# Patient Record
Sex: MALE | Race: WHITE | NOT HISPANIC OR LATINO | Employment: FULL TIME | URBAN - METROPOLITAN AREA
[De-identification: names, ages, dates, MRNs, and addresses within clinical notes are randomized per-mention and may not be internally consistent; named-entity substitution may affect disease eponyms.]

---

## 2023-06-28 RX ORDER — CETIRIZINE HYDROCHLORIDE 10 MG/1
1 TABLET ORAL DAILY
COMMUNITY
Start: 2020-05-12

## 2023-06-28 RX ORDER — ALBUTEROL SULFATE 90 UG/1
2 AEROSOL, METERED RESPIRATORY (INHALATION)
COMMUNITY
Start: 2020-05-12 | End: 2023-07-07 | Stop reason: SDUPTHER

## 2023-06-28 RX ORDER — BUSPIRONE HYDROCHLORIDE 10 MG/1
10 TABLET ORAL EVERY 12 HOURS
COMMUNITY
Start: 2021-11-02 | End: 2023-07-07 | Stop reason: DRUGHIGH

## 2023-06-28 RX ORDER — HYDROXYZINE HYDROCHLORIDE 25 MG/1
25 TABLET, FILM COATED ORAL EVERY 6 HOURS PRN
COMMUNITY
Start: 2022-07-01 | End: 2023-07-17 | Stop reason: ALTCHOICE

## 2023-06-28 RX ORDER — MONTELUKAST SODIUM 10 MG/1
1 TABLET ORAL DAILY
COMMUNITY
Start: 2019-04-16 | End: 2023-07-07 | Stop reason: SDUPTHER

## 2023-07-05 PROBLEM — H43.393 VITREOUS FLOATERS OF BOTH EYES: Status: ACTIVE | Noted: 2023-07-05

## 2023-07-05 PROBLEM — J45.909 ASTHMA (HHS-HCC): Status: ACTIVE | Noted: 2023-07-05

## 2023-07-05 PROBLEM — R13.10 DYSPHAGIA: Status: ACTIVE | Noted: 2023-07-05

## 2023-07-05 PROBLEM — M54.2 NECK PAIN: Status: RESOLVED | Noted: 2023-07-05 | Resolved: 2023-07-05

## 2023-07-05 PROBLEM — R59.0 CERVICAL LYMPHADENOPATHY: Status: ACTIVE | Noted: 2023-07-05

## 2023-07-05 PROBLEM — G25.81 RESTLESS LEGS: Status: ACTIVE | Noted: 2023-07-05

## 2023-07-05 PROBLEM — H33.323 PERIPHERAL RETINAL HOLE OF BOTH EYES: Status: ACTIVE | Noted: 2023-07-05

## 2023-07-05 PROBLEM — H35.413 LATTICE DEGENERATION OF BOTH RETINAS: Status: ACTIVE | Noted: 2023-07-05

## 2023-07-05 PROBLEM — R29.898 OTHER SYMPTOMS AND SIGNS INVOLVING THE MUSCULOSKELETAL SYSTEM: Status: RESOLVED | Noted: 2023-07-05 | Resolved: 2023-07-05

## 2023-07-05 PROBLEM — H52.13 BILATERAL MYOPIA: Status: ACTIVE | Noted: 2023-07-05

## 2023-07-05 PROBLEM — F43.22 ACUTE ADJUSTMENT DISORDER WITH ANXIETY: Status: ACTIVE | Noted: 2023-07-05

## 2023-07-05 PROBLEM — J02.9 SORE THROAT: Status: RESOLVED | Noted: 2023-07-05 | Resolved: 2023-07-05

## 2023-07-05 PROBLEM — K21.9 CHRONIC GERD: Status: ACTIVE | Noted: 2023-07-05

## 2023-07-05 PROBLEM — H52.203 ASTIGMATISM, BILATERAL: Status: ACTIVE | Noted: 2023-07-05

## 2023-07-05 PROBLEM — K21.00 GASTRO-ESOPHAGEAL REFLUX DISEASE WITH ESOPHAGITIS: Status: ACTIVE | Noted: 2023-07-05

## 2023-07-05 PROBLEM — H52.4 BILATERAL PRESBYOPIA: Status: ACTIVE | Noted: 2023-07-05

## 2023-07-07 ENCOUNTER — OFFICE VISIT (OUTPATIENT)
Dept: PRIMARY CARE | Facility: CLINIC | Age: 53
End: 2023-07-07
Payer: COMMERCIAL

## 2023-07-07 VITALS — BODY MASS INDEX: 22.82 KG/M2 | DIASTOLIC BLOOD PRESSURE: 72 MMHG | WEIGHT: 173 LBS | SYSTOLIC BLOOD PRESSURE: 124 MMHG

## 2023-07-07 DIAGNOSIS — G25.81 RESTLESS LEGS: ICD-10-CM

## 2023-07-07 DIAGNOSIS — F41.9 ANXIETY: ICD-10-CM

## 2023-07-07 DIAGNOSIS — J30.2 SEASONAL ALLERGIC RHINITIS, UNSPECIFIED TRIGGER: ICD-10-CM

## 2023-07-07 DIAGNOSIS — J45.20 MILD INTERMITTENT ASTHMA, UNSPECIFIED WHETHER COMPLICATED (HHS-HCC): Primary | ICD-10-CM

## 2023-07-07 PROBLEM — F43.22 ACUTE ADJUSTMENT DISORDER WITH ANXIETY: Status: RESOLVED | Noted: 2023-07-05 | Resolved: 2023-07-07

## 2023-07-07 PROCEDURE — 99213 OFFICE O/P EST LOW 20 MIN: CPT | Performed by: FAMILY MEDICINE

## 2023-07-07 PROCEDURE — 1036F TOBACCO NON-USER: CPT | Performed by: FAMILY MEDICINE

## 2023-07-07 RX ORDER — MONTELUKAST SODIUM 10 MG/1
10 TABLET ORAL DAILY
Qty: 90 TABLET | Refills: 3 | Status: SHIPPED | OUTPATIENT
Start: 2023-07-07

## 2023-07-07 RX ORDER — BUSPIRONE HYDROCHLORIDE 15 MG/1
15 TABLET ORAL 2 TIMES DAILY
Qty: 180 TABLET | Refills: 3 | Status: SHIPPED | OUTPATIENT
Start: 2023-07-07 | End: 2023-09-12 | Stop reason: SDUPTHER

## 2023-07-07 RX ORDER — ALBUTEROL SULFATE 90 UG/1
2 AEROSOL, METERED RESPIRATORY (INHALATION)
Qty: 18 G | Refills: 1 | Status: SHIPPED | OUTPATIENT
Start: 2023-07-07 | End: 2023-09-14 | Stop reason: SDUPTHER

## 2023-07-07 NOTE — ASSESSMENT & PLAN NOTE
- not well controlled  - increase Buspirone from 10 mg PO BID to 15 mg PO BID  - Hydroxyzine makes him grouchy in the am and also he doesn't feel great for a few hours  - recommend counseling/therapy, he declines  - sleep is impacted

## 2023-07-07 NOTE — PROGRESS NOTES
Chief complaint:   Chief Complaint   Patient presents with    Follow-up     Yearly follow up       HPI:  Marvin Mahan is a 52 y.o. male who presents for evaluation of anxiety and his asthma. He has not needed to use his albuterol in 12 years. He takes Montlukast daily. His allergic sx are well controlled. His anxiety is not well controlled. He worries about the state of the country and the world, his wife and her medical conditions and his work. He is not sleeping well due to the anxiety. He goes to bed and is asleep by 9 pm with goal to wake at 6 am. He has restless legs. Ropinrole had SE and he is wary of other medications for this.     Physical exam:  /72   Wt 78.5 kg (173 lb)   BMI 22.82 kg/m²   General: NAD, well appearing male  Heart: RRR, no mumur appreciated  Lungs: CTAB, no wheezes, rales, rhonchi  Psych: alert and oriented    Assessment/Plan   Problem List Items Addressed This Visit       Asthma - Primary    Relevant Medications    montelukast (Singulair) 10 mg tablet    albuterol 90 mcg/actuation inhaler    Restless legs     - discussed possible referral to sleep medicine.         Seasonal allergic rhinitis    Relevant Medications    montelukast (Singulair) 10 mg tablet    Anxiety     - not well controlled  - increase Buspirone from 10 mg PO BID to 15 mg PO BID  - Hydroxyzine makes him grouchy in the am and also he doesn't feel great for a few hours  - recommend counseling/therapy, he declines  - sleep is impacted         Relevant Medications    busPIRone (Buspar) 15 mg tablet       Dawn Archer,

## 2023-07-17 ENCOUNTER — OFFICE VISIT (OUTPATIENT)
Dept: PRIMARY CARE | Facility: CLINIC | Age: 53
End: 2023-07-17
Payer: COMMERCIAL

## 2023-07-17 VITALS
TEMPERATURE: 98.1 F | DIASTOLIC BLOOD PRESSURE: 70 MMHG | SYSTOLIC BLOOD PRESSURE: 112 MMHG | BODY MASS INDEX: 22.69 KG/M2 | WEIGHT: 172 LBS

## 2023-07-17 DIAGNOSIS — R55 BRIEF LOSS OF CONSCIOUSNESS: Primary | ICD-10-CM

## 2023-07-17 LAB
ALANINE AMINOTRANSFERASE (SGPT) (U/L) IN SER/PLAS: 17 U/L (ref 10–52)
ALBUMIN (G/DL) IN SER/PLAS: 4.7 G/DL (ref 3.4–5)
ALKALINE PHOSPHATASE (U/L) IN SER/PLAS: 87 U/L (ref 33–120)
ANION GAP IN SER/PLAS: 12 MMOL/L (ref 10–20)
ASPARTATE AMINOTRANSFERASE (SGOT) (U/L) IN SER/PLAS: 19 U/L (ref 9–39)
BASOPHILS (10*3/UL) IN BLOOD BY AUTOMATED COUNT: 0.09 X10E9/L (ref 0–0.1)
BASOPHILS/100 LEUKOCYTES IN BLOOD BY AUTOMATED COUNT: 1.2 % (ref 0–2)
BILIRUBIN TOTAL (MG/DL) IN SER/PLAS: 0.5 MG/DL (ref 0–1.2)
CALCIUM (MG/DL) IN SER/PLAS: 10 MG/DL (ref 8.6–10.3)
CARBON DIOXIDE, TOTAL (MMOL/L) IN SER/PLAS: 30 MMOL/L (ref 21–32)
CHLORIDE (MMOL/L) IN SER/PLAS: 103 MMOL/L (ref 98–107)
CHOLESTEROL (MG/DL) IN SER/PLAS: 182 MG/DL (ref 0–199)
CHOLESTEROL IN HDL (MG/DL) IN SER/PLAS: 56.8 MG/DL
CHOLESTEROL/HDL RATIO: 3.2
CREATININE (MG/DL) IN SER/PLAS: 1 MG/DL (ref 0.5–1.3)
EOSINOPHILS (10*3/UL) IN BLOOD BY AUTOMATED COUNT: 0.09 X10E9/L (ref 0–0.7)
EOSINOPHILS/100 LEUKOCYTES IN BLOOD BY AUTOMATED COUNT: 1.2 % (ref 0–6)
ERYTHROCYTE DISTRIBUTION WIDTH (RATIO) BY AUTOMATED COUNT: 12.4 % (ref 11.5–14.5)
ERYTHROCYTE MEAN CORPUSCULAR HEMOGLOBIN CONCENTRATION (G/DL) BY AUTOMATED: 32.8 G/DL (ref 32–36)
ERYTHROCYTE MEAN CORPUSCULAR VOLUME (FL) BY AUTOMATED COUNT: 91 FL (ref 80–100)
ERYTHROCYTES (10*6/UL) IN BLOOD BY AUTOMATED COUNT: 5.17 X10E12/L (ref 4.5–5.9)
GFR MALE: >90 ML/MIN/1.73M2
GLUCOSE (MG/DL) IN SER/PLAS: 94 MG/DL (ref 74–99)
HEMATOCRIT (%) IN BLOOD BY AUTOMATED COUNT: 47.3 % (ref 41–52)
HEMOGLOBIN (G/DL) IN BLOOD: 15.5 G/DL (ref 13.5–17.5)
IMMATURE GRANULOCYTES/100 LEUKOCYTES IN BLOOD BY AUTOMATED COUNT: 0.3 % (ref 0–0.9)
LDL: 108 MG/DL (ref 0–99)
LEUKOCYTES (10*3/UL) IN BLOOD BY AUTOMATED COUNT: 7.4 X10E9/L (ref 4.4–11.3)
LYMPHOCYTES (10*3/UL) IN BLOOD BY AUTOMATED COUNT: 2.02 X10E9/L (ref 1.2–4.8)
LYMPHOCYTES/100 LEUKOCYTES IN BLOOD BY AUTOMATED COUNT: 27.3 % (ref 13–44)
MAGNESIUM (MG/DL) IN SER/PLAS: 2.37 MG/DL (ref 1.6–2.4)
MONOCYTES (10*3/UL) IN BLOOD BY AUTOMATED COUNT: 0.61 X10E9/L (ref 0.1–1)
MONOCYTES/100 LEUKOCYTES IN BLOOD BY AUTOMATED COUNT: 8.2 % (ref 2–10)
NEUTROPHILS (10*3/UL) IN BLOOD BY AUTOMATED COUNT: 4.57 X10E9/L (ref 1.2–7.7)
NEUTROPHILS/100 LEUKOCYTES IN BLOOD BY AUTOMATED COUNT: 61.8 % (ref 40–80)
PLATELETS (10*3/UL) IN BLOOD AUTOMATED COUNT: 310 X10E9/L (ref 150–450)
POTASSIUM (MMOL/L) IN SER/PLAS: 4.7 MMOL/L (ref 3.5–5.3)
PROTEIN TOTAL: 7.3 G/DL (ref 6.4–8.2)
SODIUM (MMOL/L) IN SER/PLAS: 140 MMOL/L (ref 136–145)
THYROTROPIN (MIU/L) IN SER/PLAS BY DETECTION LIMIT <= 0.05 MIU/L: 0.84 MIU/L (ref 0.44–3.98)
TRIGLYCERIDE (MG/DL) IN SER/PLAS: 84 MG/DL (ref 0–149)
UREA NITROGEN (MG/DL) IN SER/PLAS: 16 MG/DL (ref 6–23)
VLDL: 17 MG/DL (ref 0–40)

## 2023-07-17 PROCEDURE — 80053 COMPREHEN METABOLIC PANEL: CPT

## 2023-07-17 PROCEDURE — 93000 ELECTROCARDIOGRAM COMPLETE: CPT | Performed by: FAMILY MEDICINE

## 2023-07-17 PROCEDURE — 85025 COMPLETE CBC W/AUTO DIFF WBC: CPT

## 2023-07-17 PROCEDURE — 84443 ASSAY THYROID STIM HORMONE: CPT

## 2023-07-17 PROCEDURE — 83036 HEMOGLOBIN GLYCOSYLATED A1C: CPT

## 2023-07-17 PROCEDURE — 1036F TOBACCO NON-USER: CPT | Performed by: FAMILY MEDICINE

## 2023-07-17 PROCEDURE — 99214 OFFICE O/P EST MOD 30 MIN: CPT | Performed by: FAMILY MEDICINE

## 2023-07-17 PROCEDURE — 83735 ASSAY OF MAGNESIUM: CPT

## 2023-07-17 PROCEDURE — 80061 LIPID PANEL: CPT

## 2023-07-17 ASSESSMENT — ENCOUNTER SYMPTOMS
ABDOMINAL PAIN: 1
PALPITATIONS: 0
NAUSEA: 1
HEMATURIA: 0
CHILLS: 0
DYSPHORIC MOOD: 0
BLOOD IN STOOL: 0
DIAPHORESIS: 1
FEVER: 0
NERVOUS/ANXIOUS: 1
FREQUENCY: 0
COUGH: 0
SLEEP DISTURBANCE: 1
SHORTNESS OF BREATH: 0
DEPRESSION: 0
DYSURIA: 0
HEADACHES: 0
DIZZINESS: 1
DIARRHEA: 1

## 2023-07-17 ASSESSMENT — PATIENT HEALTH QUESTIONNAIRE - PHQ9
2. FEELING DOWN, DEPRESSED OR HOPELESS: NOT AT ALL
SUM OF ALL RESPONSES TO PHQ9 QUESTIONS 1 AND 2: 0
1. LITTLE INTEREST OR PLEASURE IN DOING THINGS: NOT AT ALL

## 2023-07-17 NOTE — PATIENT INSTRUCTIONS
I ordered labs, an EKG, an EEG and a neurology consult to evaluate you for a seizure.  Based upon the description of your events, it sounds more likely to be syncope, or a fainting event.  I recommend that you stop the new supplement of GABY that you recently started.  I recommend that you not swim or drive until you are cleared by Dr Archer or by neurology.  Return to see Dr Archer later this week.

## 2023-07-17 NOTE — PROGRESS NOTES
Subjective   Patient ID: [id number]     Marvin Mahan is a 52 y.o. male who presents for ? seizure  (Last night.).  HPI    He is here with his step daughter.      His wife was the witness of this episode.    He complains of a possible seizure last night.      He has a history of allergies and anxiety.  He is on buspar, cetirizine, montelukast and albuterol.      He woke up at 11:30 last night.  He had a bad belly ache.  He had a BM.  He went back to bed.  Then he had more abdominal pain and went back to the toilet.  He had bad diarrhea.  He was feeling faint and he called his wife in.  She came in to the bathroom.  She said he was pale and cold.  She said he lost consciousness.  He tensed up.  She did not mention any convulsions.  His arm and leg tensed up and were raised up for a while.  No tonic-clonic activity.  He was out for about fifteen seconds.  Afterward, he was confused for just a few seconds and then he felt back to normal.  He still felt nauseated.  He feels his normal self now.  He no longer feels nauseated.  Right now, he feels like his normal self.      His buspar was just increased.    He had just started taking an amino acid called [place] to try to help his restless leg syndrome.      He was dizzy just before this episode.    Denies head injuries last night.  Hit head 9 months ago with fall.      He did not fall to the ground.    Review of Systems   Constitutional:  Positive for diaphoresis (last night.). Negative for chills and fever.   Respiratory:  Negative for cough and shortness of breath.    Cardiovascular:  Negative for chest pain, palpitations and leg swelling.   Gastrointestinal:  Positive for abdominal pain, diarrhea and nausea. Negative for blood in stool.        Symptoms were last night.  Resolved today.   Genitourinary:  Negative for dysuria, frequency and hematuria.   Neurological:  Positive for dizziness. Negative for headaches.        Dizziness prior to event last night.    Psychiatric/Behavioral:  Positive for sleep disturbance. Negative for dysphoric mood. The patient is nervous/anxious.        Objective     /70 (BP Location: Left arm, Patient Position: Sitting)   Temp 36.7 °C (98.1 °F)   Wt 78 kg (172 lb)   BMI 22.69 kg/m²      Physical Exam  Constitutional:       Appearance: Normal appearance.   Cardiovascular:      Rate and Rhythm: Normal rate and regular rhythm.      Heart sounds: Normal heart sounds. No murmur heard.  Pulmonary:      Effort: Pulmonary effort is normal. No respiratory distress.      Breath sounds: Normal breath sounds.   Abdominal:      General: Abdomen is flat.      Palpations: Abdomen is soft.      Tenderness: There is no abdominal tenderness. There is no guarding or rebound.   Musculoskeletal:      Right lower leg: No edema.      Left lower leg: No edema.   Neurological:      General: No focal deficit present.      Mental Status: He is alert and oriented to person, place, and time.      Sensory: No sensory deficit.      Motor: No weakness.      Coordination: Coordination normal.      Gait: Gait normal.      Comments: Heel and toe walk normal   Psychiatric:         Mood and Affect: Mood normal.         Behavior: Behavior normal.         Thought Content: Thought content normal.         Assessment/Plan   Problem List Items Addressed This Visit    None  Visit Diagnoses       Brief loss of consciousness    -  Primary    Relevant Orders    CBC and Auto Differential    Hemoglobin A1C    Comprehensive Metabolic Panel    Lipid Panel    Thyroid Stimulating Hormone    Magnesium    ECG 12 lead (Clinic Performed)    Referral to Neurology    EEG          I ordered labs, an EKG, an EEG and a neurology consult to evaluate you for a seizure.  Based upon the description of your events, it sounds more likely to be syncope, or a fainting event.  I recommend that you stop the new supplement of GABY that you recently started.  I recommend that you not swim or drive until  you are cleared by Dr Archer or by neurology.  Return to see Dr Archer later this week.    [place], DO

## 2023-07-18 ENCOUNTER — OFFICE VISIT (OUTPATIENT)
Dept: PRIMARY CARE | Facility: CLINIC | Age: 53
End: 2023-07-18
Payer: COMMERCIAL

## 2023-07-18 VITALS — SYSTOLIC BLOOD PRESSURE: 118 MMHG | BODY MASS INDEX: 22.69 KG/M2 | DIASTOLIC BLOOD PRESSURE: 60 MMHG | WEIGHT: 172 LBS

## 2023-07-18 DIAGNOSIS — R55 VASOVAGAL SYNCOPE: Primary | ICD-10-CM

## 2023-07-18 LAB
ESTIMATED AVERAGE GLUCOSE FOR HBA1C: 111 MG/DL
HEMOGLOBIN A1C/HEMOGLOBIN TOTAL IN BLOOD: 5.5 %

## 2023-07-18 PROCEDURE — 99213 OFFICE O/P EST LOW 20 MIN: CPT | Performed by: FAMILY MEDICINE

## 2023-07-18 PROCEDURE — 1036F TOBACCO NON-USER: CPT | Performed by: FAMILY MEDICINE

## 2023-07-18 NOTE — PROGRESS NOTES
Chief complaint:   Chief Complaint   Patient presents with    Follow-up     1 day follow up for brief loss of consciousness        HPI:  Marvin Mahan is a 52 y.o. male who presents for evaluation of episode of brief loss of consciousness.     6pm woke up with a stomach ache. He used the toilet and had a normal BM. He couldn't go to sleep, stomach ache worsened. Went to bathroom again and had terrible diarrhea with cramping. He started feeling faint. Wife came in and told him he was white and cold and he felt like he was in a cold sweat. His wife witnessed him pass out then get stiff and shake for a few seconds then come to. He came out of it and thought his wife was his sister. The confusion lasted a few seconds then resolved. He felt ok after that. He did use the toilet a few more times that night and felt normal the next morning. He denies any headache, new or changed sx. His wife got diarrhea the next am.     Physical exam:  /60   Wt 78 kg (172 lb)   BMI 22.69 kg/m²   General: NAD, well appearing male  Heart: RRR, no mumur appreciated  Lungs: CTAB, no wheezes, rales, rhonchi  Abdomen: soft, non tender, normoactive BS, no organomegaly  Extremities: No LE edema  Neuro: no gross deficits    Assessment/Plan   Problem List Items Addressed This Visit    None  Visit Diagnoses       Vasovagal syncope    -  Primary        :abs reviewed with patient  Follow up ZAINAB Archer DO

## 2023-09-11 PROBLEM — D22.4 MELANOCYTIC NEVI OF SCALP AND NECK: Status: RESOLVED | Noted: 2019-11-26 | Resolved: 2023-09-11

## 2023-09-11 PROBLEM — L81.4 OTHER MELANIN HYPERPIGMENTATION: Status: RESOLVED | Noted: 2019-11-26 | Resolved: 2023-09-11

## 2023-09-11 PROBLEM — H43.399 VITREOUS FLOATERS: Status: ACTIVE | Noted: 2023-09-11

## 2023-09-11 PROBLEM — D18.01 HEMANGIOMA OF SKIN AND SUBCUTANEOUS TISSUE: Status: RESOLVED | Noted: 2019-11-26 | Resolved: 2023-09-11

## 2023-09-11 PROBLEM — R29.898 IMPAIRED FLEXIBILITY OF LOWER EXTREMITY: Status: RESOLVED | Noted: 2023-09-11 | Resolved: 2023-09-11

## 2023-09-11 PROBLEM — L82.1 OTHER SEBORRHEIC KERATOSIS: Status: RESOLVED | Noted: 2019-11-26 | Resolved: 2023-09-11

## 2023-09-11 PROBLEM — R55 BRIEF LOSS OF CONSCIOUSNESS: Status: ACTIVE | Noted: 2023-09-11

## 2023-09-11 PROBLEM — D22.5 MELANOCYTIC NEVI OF TRUNK: Status: RESOLVED | Noted: 2019-11-26 | Resolved: 2023-09-11

## 2023-09-11 PROBLEM — D22.39 MELANOCYTIC NEVI OF OTHER PARTS OF FACE: Status: RESOLVED | Noted: 2019-11-26 | Resolved: 2023-09-11

## 2023-09-12 ENCOUNTER — TELEMEDICINE (OUTPATIENT)
Dept: PRIMARY CARE | Facility: CLINIC | Age: 53
End: 2023-09-12
Payer: COMMERCIAL

## 2023-09-12 DIAGNOSIS — F41.9 ANXIETY: ICD-10-CM

## 2023-09-12 PROCEDURE — 99213 OFFICE O/P EST LOW 20 MIN: CPT | Performed by: FAMILY MEDICINE

## 2023-09-12 RX ORDER — BUSPIRONE HYDROCHLORIDE 15 MG/1
15 TABLET ORAL 2 TIMES DAILY
Qty: 180 TABLET | Refills: 3 | Status: SHIPPED | OUTPATIENT
Start: 2023-09-12 | End: 2024-09-11

## 2023-09-12 NOTE — PROGRESS NOTES
Chief complaint:   Chief Complaint   Patient presents with    Anxiety     Virtual or Telephone Consent    An interactive audio and video telecommunication system which permits real time communications between the patient (at the originating site) and provider (at the distant site) was utilized to provide this telehealth service.   Verbal consent was requested and obtained from Marvin Mahan on this date, 09/12/23 for a telehealth visit.       HPI:  Marvin Mahan is a 53 y.o. male who presents for evaluation of anxiety for which he has been taking Buspirone. Since the dose increase he is unsure if this has helped much as his dad has been in and out of the hospital (pancreatic cancer) and his identity was stolen 2 days ago. He was able to enjoy his vacation but has overall had trouble sleeping and with his anxiety. He is unsure if he would like to make any medication additions/adjustments.     Physical exam:  There were no vitals taken for this visit.  General: NAD, well appearing male    Assessment/Plan   Problem List Items Addressed This Visit       Anxiety     - not well controlled but has had a lot of life stressors  - continue Buspirone 15 mg PO BID  - discussed alternatives and add on therapy, he declines currently  - counseling/therapy recommended previously  - follow up 6 mo, sooner as needed         Relevant Medications    busPIRone (Buspar) 15 mg tablet       Dawn Archer, DO

## 2023-09-12 NOTE — ASSESSMENT & PLAN NOTE
- not well controlled but has had a lot of life stressors  - continue Buspirone 15 mg PO BID  - discussed alternatives and add on therapy, he declines currently  - counseling/therapy recommended previously  - follow up 6 mo, sooner as needed

## 2023-09-14 DIAGNOSIS — J45.20 MILD INTERMITTENT ASTHMA, UNSPECIFIED WHETHER COMPLICATED (HHS-HCC): ICD-10-CM

## 2023-09-14 RX ORDER — ALBUTEROL SULFATE 90 UG/1
2 AEROSOL, METERED RESPIRATORY (INHALATION)
Qty: 8 G | Refills: 1 | Status: SHIPPED
Start: 2023-09-14

## 2024-06-18 ENCOUNTER — APPOINTMENT (OUTPATIENT)
Dept: PRIMARY CARE | Facility: CLINIC | Age: 54
End: 2024-06-18
Payer: COMMERCIAL

## 2024-06-18 VITALS
TEMPERATURE: 98.4 F | BODY MASS INDEX: 20.69 KG/M2 | DIASTOLIC BLOOD PRESSURE: 60 MMHG | HEIGHT: 74 IN | SYSTOLIC BLOOD PRESSURE: 118 MMHG | WEIGHT: 161.2 LBS

## 2024-06-18 DIAGNOSIS — J45.20 MILD INTERMITTENT ASTHMA, UNSPECIFIED WHETHER COMPLICATED (HHS-HCC): ICD-10-CM

## 2024-06-18 DIAGNOSIS — Z12.5 PROSTATE CANCER SCREENING: ICD-10-CM

## 2024-06-18 DIAGNOSIS — F41.9 ANXIETY: ICD-10-CM

## 2024-06-18 DIAGNOSIS — Z00.00 WELLNESS EXAMINATION: ICD-10-CM

## 2024-06-18 DIAGNOSIS — Z12.11 COLON CANCER SCREENING: Primary | ICD-10-CM

## 2024-06-18 DIAGNOSIS — J30.2 SEASONAL ALLERGIC RHINITIS, UNSPECIFIED TRIGGER: ICD-10-CM

## 2024-06-18 PROCEDURE — 99214 OFFICE O/P EST MOD 30 MIN: CPT | Performed by: FAMILY MEDICINE

## 2024-06-18 PROCEDURE — 1036F TOBACCO NON-USER: CPT | Performed by: FAMILY MEDICINE

## 2024-06-18 RX ORDER — BUSPIRONE HYDROCHLORIDE 15 MG/1
15 TABLET ORAL 2 TIMES DAILY
Qty: 180 TABLET | Refills: 3 | Status: SHIPPED | OUTPATIENT
Start: 2024-06-18 | End: 2025-06-18

## 2024-06-18 RX ORDER — MONTELUKAST SODIUM 10 MG/1
10 TABLET ORAL DAILY
Qty: 90 TABLET | Refills: 3 | Status: SHIPPED | OUTPATIENT
Start: 2024-06-18

## 2024-06-18 NOTE — PROGRESS NOTES
"Chief complaint:   Chief Complaint   Patient presents with    Med Refill     Prostate check        HPI:  Marvin Mahan is a 53 y.o. male who presents for evaluation of his anxiety and for updated labs. He would like prostate cancer screening. He reports his urinary stream is not as good as it used to be but overall no dysuria, frequency, urgency, hesitancy. His paternal grandfather had prostate cancer.     Smoked for a very short time 1992  Alcohol: intermittent    Physical exam:  /60 (BP Location: Right arm, Patient Position: Sitting)   Temp 36.9 °C (98.4 °F)   Ht 1.88 m (6' 2\")   Wt 73.1 kg (161 lb 3.2 oz)   BMI 20.70 kg/m²   General: NAD, well appearing male  Heart: RRR, no mumur appreciated  Lungs: CTAB, no wheezes, rales, rhonchi  Abdomen: soft, non tender, normoactive BS, no organomegaly  Rectal: prostate soft, smooth, no nodularity    Assessment/Plan   Problem List Items Addressed This Visit       Asthma ([facility])    Relevant Medications    montelukast (Singulair) 10 mg tablet    Seasonal allergic rhinitis    Relevant Medications    montelukast (Singulair) 10 mg tablet    Anxiety    Relevant Medications    busPIRone (Buspar) 15 mg tablet     Other Visit Diagnoses       Colon cancer screening    -  Primary    Relevant Orders    Cologuard® colon cancer screening    Wellness examination        Relevant Orders    Lipid Panel    Comprehensive Metabolic Panel    CBC    Prostate cancer screening        Relevant Orders    PSA, total and free        Medications refilled as above, asthma is well controlled as well as his allergies. His anxiety is well controlled as well. He is due for repeat colon cancer screening and elects for cologuard testing. Labs ordered as above. Follow up 6 mo, sooner as needed.     Dawn Archer, DO        "

## 2024-06-22 ENCOUNTER — LAB (OUTPATIENT)
Dept: LAB | Facility: LAB | Age: 54
End: 2024-06-22
Payer: COMMERCIAL

## 2024-06-22 DIAGNOSIS — Z12.5 PROSTATE CANCER SCREENING: ICD-10-CM

## 2024-06-22 DIAGNOSIS — Z00.00 WELLNESS EXAMINATION: ICD-10-CM

## 2024-06-22 LAB
ALBUMIN SERPL BCP-MCNC: 4.9 G/DL (ref 3.4–5)
ALP SERPL-CCNC: 85 U/L (ref 33–120)
ALT SERPL W P-5'-P-CCNC: 15 U/L (ref 10–52)
ANION GAP SERPL CALC-SCNC: 16 MMOL/L (ref 10–20)
AST SERPL W P-5'-P-CCNC: 17 U/L (ref 9–39)
BILIRUB SERPL-MCNC: 1 MG/DL (ref 0–1.2)
BUN SERPL-MCNC: 18 MG/DL (ref 6–23)
CALCIUM SERPL-MCNC: 10.4 MG/DL (ref 8.6–10.6)
CHLORIDE SERPL-SCNC: 101 MMOL/L (ref 98–107)
CHOLEST SERPL-MCNC: 170 MG/DL (ref 0–199)
CHOLESTEROL/HDL RATIO: 2.9
CO2 SERPL-SCNC: 28 MMOL/L (ref 21–32)
CREAT SERPL-MCNC: 1.07 MG/DL (ref 0.5–1.3)
EGFRCR SERPLBLD CKD-EPI 2021: 83 ML/MIN/1.73M*2
ERYTHROCYTE [DISTWIDTH] IN BLOOD BY AUTOMATED COUNT: 12.7 % (ref 11.5–14.5)
GLUCOSE SERPL-MCNC: 88 MG/DL (ref 74–99)
HCT VFR BLD AUTO: 46.6 % (ref 41–52)
HDLC SERPL-MCNC: 59.4 MG/DL
HGB BLD-MCNC: 15.5 G/DL (ref 13.5–17.5)
LDLC SERPL CALC-MCNC: 95 MG/DL
MCH RBC QN AUTO: 29.9 PG (ref 26–34)
MCHC RBC AUTO-ENTMCNC: 33.3 G/DL (ref 32–36)
MCV RBC AUTO: 90 FL (ref 80–100)
NON HDL CHOLESTEROL: 111 MG/DL (ref 0–149)
NRBC BLD-RTO: 0 /100 WBCS (ref 0–0)
PLATELET # BLD AUTO: 322 X10*3/UL (ref 150–450)
POTASSIUM SERPL-SCNC: 4.7 MMOL/L (ref 3.5–5.3)
PROT SERPL-MCNC: 7.5 G/DL (ref 6.4–8.2)
RBC # BLD AUTO: 5.19 X10*6/UL (ref 4.5–5.9)
SODIUM SERPL-SCNC: 140 MMOL/L (ref 136–145)
TRIGL SERPL-MCNC: 80 MG/DL (ref 0–149)
VLDL: 16 MG/DL (ref 0–40)
WBC # BLD AUTO: 4.9 X10*3/UL (ref 4.4–11.3)

## 2024-06-22 PROCEDURE — 80053 COMPREHEN METABOLIC PANEL: CPT

## 2024-06-22 PROCEDURE — 36415 COLL VENOUS BLD VENIPUNCTURE: CPT

## 2024-06-22 PROCEDURE — 85027 COMPLETE CBC AUTOMATED: CPT

## 2024-06-22 PROCEDURE — 84154 ASSAY OF PSA FREE: CPT

## 2024-06-22 PROCEDURE — 84153 ASSAY OF PSA TOTAL: CPT

## 2024-06-22 PROCEDURE — 80061 LIPID PANEL: CPT

## 2024-06-24 LAB
PSA FREE MFR SERPL: 40 %
PSA FREE SERPL-MCNC: 0.2 NG/ML
PSA SERPL IA-MCNC: 0.5 NG/ML (ref 0–4)

## 2024-07-12 ENCOUNTER — OFFICE VISIT (OUTPATIENT)
Dept: PRIMARY CARE | Facility: CLINIC | Age: 54
End: 2024-07-12
Payer: COMMERCIAL

## 2024-07-12 VITALS — SYSTOLIC BLOOD PRESSURE: 124 MMHG | TEMPERATURE: 96.4 F | DIASTOLIC BLOOD PRESSURE: 78 MMHG

## 2024-07-12 DIAGNOSIS — M54.50 ACUTE BILATERAL LOW BACK PAIN WITHOUT SCIATICA: Primary | ICD-10-CM

## 2024-07-12 PROCEDURE — 99213 OFFICE O/P EST LOW 20 MIN: CPT | Performed by: FAMILY MEDICINE

## 2024-07-12 PROCEDURE — 1036F TOBACCO NON-USER: CPT | Performed by: FAMILY MEDICINE

## 2024-07-12 RX ORDER — NAPROXEN 500 MG/1
500 TABLET ORAL 2 TIMES DAILY PRN
Qty: 28 TABLET | Refills: 0 | Status: SHIPPED | OUTPATIENT
Start: 2024-07-12 | End: 2024-07-26

## 2024-07-12 NOTE — PROGRESS NOTES
Chief complaint:   Chief Complaint   Patient presents with    Back Pain     1 day       HPI:  Marvin Mahan is a 53 y.o. male who presents for evaluation of back pain which started 2 days ago after stepping on a metal cat toy with a bell and jolting/hopping. Pain stated the next am. Low back pain centrally. No radiation of pain. Weakness/numbness/tingling in the legs. No loss of bowel or bladder incontinence.     Physical exam:  /78   Temp 35.8 °C (96.4 °F)   General: NAD, well appearing male  Heart: RRR, no mumur appreciated  Lungs: CTAB, no wheezes, rales, rhonchi  Back: tenderness to the paraspinal musculature on the right. No bony tenderness  MSK: Negative straight leg raise +5/5 strength LE symmetric bilaterally  Neuro: +2/4 symmetric patellar and achilles reflexes, sensation grossly intact    Assessment/Plan   Problem List Items Addressed This Visit    None  Visit Diagnoses       Acute bilateral low back pain without sciatica    -  Primary    Relevant Medications    naproxen (Naprosyn) 500 mg tablet        Naproxen 500 mg PO BID to be taken with food x 7-14 days, no other NSAIDs to be used. Has tolerated this in the past (allergy testing as a child positive for aspirin). Follow up 2 weeks if not improved, sooner if sx change or worsen.    Dawn Archer,

## 2024-07-12 NOTE — LETTER
Marvin Mahan  1970 7/12/2024    To Whom This May Concern:    My patient, Marvin Mahan, is unable to perform Jury Duty due to a mental or physical condition that renders the prospective juror unfit for jury service for the next month due to acute back pain in the process of treatment.    Juror number:  panel ID [id number], juror seat # [phone]    Should you have any questions please do not hesitate to call.    Thank you for your cooperation.    Sincerely,    Dawn Archer, DO

## 2024-07-17 LAB — NONINV COLON CA DNA+OCC BLD SCRN STL QL: NEGATIVE

## 2024-08-06 ENCOUNTER — OFFICE VISIT (OUTPATIENT)
Dept: PRIMARY CARE | Facility: CLINIC | Age: 54
End: 2024-08-06
Payer: COMMERCIAL

## 2024-08-06 VITALS — WEIGHT: 161 LBS | DIASTOLIC BLOOD PRESSURE: 74 MMHG | SYSTOLIC BLOOD PRESSURE: 116 MMHG | BODY MASS INDEX: 20.67 KG/M2
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
## 2024-08-06 PROCEDURE — 99213 OFFICE O/P EST LOW 20 MIN: CPT | Performed by: FAMILY MEDICINE

## 2024-08-06 RX ORDER — CEPHALEXIN 250 MG/1
250 CAPSULE ORAL 4 TIMES DAILY
Qty: 28 CAPSULE | Refills: 0 | Status: SHIPPED | OUTPATIENT
Start: 2024-08-06 | End: 2024-08-06

## 2024-08-06 RX ORDER — CEPHALEXIN 250 MG/1
250 CAPSULE ORAL 4 TIMES DAILY
Qty: 28 CAPSULE | Refills: 0 | Status: SHIPPED | OUTPATIENT
Start: 2024-08-06 | End: 2024-08-13

## 2024-08-06 NOTE — PROGRESS NOTES
Chief complaint:   Chief Complaint   Patient presents with    Left ankle redness     Slight swelling, itching       HPI:  Marvin Mahan is a 54 y.o. male who presents for evaluation of left ankle redness starting 2 days ago after noticing a small area he thought may be a bug bite. It is very itchy. His wife marked the area and applied hydrocortisone last night with improvement in the itching. No change in the redness. He states he is overall doing well without fevers, chills, ill feelings, or malaise. No streaking up the leg.     Physical exam:  /74   Wt 73 kg (161 lb)   BMI 20.67 kg/m²   General: NAD, well appearing male  Leg: left ankle with well demarcated redness and area in the center with mild skin breakdown. No fluctuance or induration.     Assessment/Plan   Problem List Items Addressed This Visit    None  Visit Diagnoses       Bug bite, initial encounter    -  Primary    Relevant Medications    cephalexin (Keflex) 250 mg capsule        My have early cellulitis though sx improving today. Will be leaving for a cruise in 3 days. Advised if redness extends beyond the marking, pus or drainage develops to start the abx. If it continues to improve, he is not to start the antibiotic. Advised him to be re-evaluated if any systemic sx develop and/or if lesion worsens or fails to resolve within 1 week.    Dawn Archer, DO

## 2025-06-13 DIAGNOSIS — J45.20 MILD INTERMITTENT ASTHMA, UNSPECIFIED WHETHER COMPLICATED (HHS-HCC): ICD-10-CM

## 2025-06-13 DIAGNOSIS — J30.2 SEASONAL ALLERGIC RHINITIS, UNSPECIFIED TRIGGER: ICD-10-CM

## 2025-06-13 RX ORDER — MONTELUKAST SODIUM 10 MG/1
10 TABLET ORAL DAILY
Qty: 90 TABLET | Refills: 3 | OUTPATIENT
Start: 2025-06-13

## 2025-07-09 ENCOUNTER — APPOINTMENT (OUTPATIENT)
Dept: PRIMARY CARE | Facility: CLINIC | Age: 55
End: 2025-07-09
Payer: COMMERCIAL

## 2025-07-09 VITALS
BODY MASS INDEX: 20.79 KG/M2 | TEMPERATURE: 97.3 F | SYSTOLIC BLOOD PRESSURE: 116 MMHG | HEIGHT: 74 IN | WEIGHT: 162 LBS | DIASTOLIC BLOOD PRESSURE: 68 MMHG

## 2025-07-09 DIAGNOSIS — J30.2 SEASONAL ALLERGIC RHINITIS, UNSPECIFIED TRIGGER: ICD-10-CM

## 2025-07-09 DIAGNOSIS — Z23 NEED FOR VACCINATION: ICD-10-CM

## 2025-07-09 DIAGNOSIS — J45.20 MILD INTERMITTENT ASTHMA, UNSPECIFIED WHETHER COMPLICATED (HHS-HCC): ICD-10-CM

## 2025-07-09 DIAGNOSIS — Z00.00 WELLNESS EXAMINATION: Primary | ICD-10-CM

## 2025-07-09 DIAGNOSIS — Z12.5 SCREENING FOR PROSTATE CANCER: ICD-10-CM

## 2025-07-09 DIAGNOSIS — F41.9 ANXIETY: ICD-10-CM

## 2025-07-09 PROCEDURE — 90472 IMMUNIZATION ADMIN EACH ADD: CPT | Performed by: FAMILY MEDICINE

## 2025-07-09 PROCEDURE — 3008F BODY MASS INDEX DOCD: CPT | Performed by: FAMILY MEDICINE

## 2025-07-09 PROCEDURE — 90715 TDAP VACCINE 7 YRS/> IM: CPT | Performed by: FAMILY MEDICINE

## 2025-07-09 PROCEDURE — 99396 PREV VISIT EST AGE 40-64: CPT | Performed by: FAMILY MEDICINE

## 2025-07-09 PROCEDURE — 90677 PCV20 VACCINE IM: CPT | Performed by: FAMILY MEDICINE

## 2025-07-09 PROCEDURE — 90471 IMMUNIZATION ADMIN: CPT | Performed by: FAMILY MEDICINE

## 2025-07-09 RX ORDER — BUSPIRONE HYDROCHLORIDE 15 MG/1
15 TABLET ORAL 2 TIMES DAILY
Qty: 180 TABLET | Refills: 3 | Status: SHIPPED | OUTPATIENT
Start: 2025-07-09 | End: 2026-07-09

## 2025-07-09 RX ORDER — MONTELUKAST SODIUM 10 MG/1
10 TABLET ORAL DAILY
Qty: 90 TABLET | Refills: 3 | Status: SHIPPED | OUTPATIENT
Start: 2025-07-09

## 2025-07-09 RX ORDER — ALBUTEROL SULFATE 90 UG/1
2 INHALANT RESPIRATORY (INHALATION)
Qty: 8 G | Refills: 1 | Status: SHIPPED | OUTPATIENT
Start: 2025-07-09

## 2025-07-09 ASSESSMENT — PATIENT HEALTH QUESTIONNAIRE - PHQ9
2. FEELING DOWN, DEPRESSED OR HOPELESS: SEVERAL DAYS
SUM OF ALL RESPONSES TO PHQ9 QUESTIONS 1 AND 2: 2
1. LITTLE INTEREST OR PLEASURE IN DOING THINGS: SEVERAL DAYS

## 2025-07-09 NOTE — PROGRESS NOTES
"Chief complaint:   Chief Complaint   Patient presents with    Annual Exam       HPI:  Marvin Mahan is a 54 y.o. male who presents for a physical    Exercise: active, no regular exercise  Alcohol: 1 x weekly (3 beers)  Tobacco: none (1 year in 1992)  Drugs: none    ROS:  Constitutional:  Denies fevers, chills, night sweats  HEENT: Denies change in vision, change in hearing, sore throat, rhinorrhea, congestion  Cardiovascular: Denies chest pain, SOB, racing heart, slow heart rate, palpitations, leg edema  Pulmonary: Denies cough, wheezing, SOB  Gastrointestinal: Denies abdominal pain, diarrhea, constipation, nausea, vomiting, heartburn  Genitourinary: Denies dysuria, hematuria, incontinence, sexual dysfunction  Integumentary: Denies rash, new or changed skin lesions  Neuro: Denies headache, numbness, tingling  Musculoskeletal: Admits to left knee pain sometimes Denies myalgias, back pain  Psych: denies change in mood, sleeping difficulties  Heme: denies bruising or bleeding     Physical exam:  /68 (BP Location: Left arm, Patient Position: Sitting)   Temp 36.3 °C (97.3 °F)   Ht 1.88 m (6' 2\")   Wt 73.5 kg (162 lb)   BMI 20.80 kg/m²   General: NAD, well appearing male  Head: normocephalic  Ears: EAC patent, TM normal bilaterally  Eyes: EOM intact, PERRLA  Nose: moist  Mouth: moist, good dentition  Heart: RRR, no murmur appreciated  Lungs: CTAB, no wheezes, rales, rhonchi  Abdomen: soft, non tender, no organomegaly  Psych: mood and affect congruent, alert and oriented  MSK: +5/5 gross strength  Neuro: +2/4 patellar and biceps reflexes, sensation grossly intact  Skin: warm and dry    Assessment/Plan   Problem List Items Addressed This Visit       Asthma    Relevant Medications    montelukast (Singulair) 10 mg tablet    albuterol 90 mcg/actuation inhaler    Seasonal allergic rhinitis    Relevant Medications    montelukast (Singulair) 10 mg tablet    Anxiety    Relevant Medications    busPIRone (Buspar) 15 mg " tablet     Other Visit Diagnoses         Wellness examination    -  Primary    Relevant Orders    Comprehensive Metabolic Panel    Lipid Panel      Screening for prostate cancer          Need for vaccination        Relevant Orders    Pneumococcal conjugate vaccine, 20-valent (PREVNAR 20) (Completed)    Tdap vaccine, age 7 years and older  (BOOSTRIX) (Completed)        Last colon cancer screening: cologuard 7/12/2024, negative, rescreen due 7/2027  Last prostate cancer screening 6/22/2024, normal PSA, asymptomatic and declines screening this year  Last Tdap, unknown, patient thinks he has had this in 2021 (at his endoscopy), booster given today  Last Pneumococcal vaccination, 2021 Pneumococcal -23, recommend Prevnar-20 which was given today  Shingrix vaccination series is complete  Fasting labs ordered    Dawn Archer, DO